# Patient Record
Sex: FEMALE | Race: WHITE
[De-identification: names, ages, dates, MRNs, and addresses within clinical notes are randomized per-mention and may not be internally consistent; named-entity substitution may affect disease eponyms.]

---

## 2021-10-28 ENCOUNTER — HOSPITAL ENCOUNTER (OUTPATIENT)
Dept: HOSPITAL 75 - OCC | Age: 36
End: 2021-10-28
Attending: FAMILY MEDICINE

## 2021-10-28 DIAGNOSIS — M54.9: Primary | ICD-10-CM

## 2021-10-28 PROCEDURE — 72070 X-RAY EXAM THORAC SPINE 2VWS: CPT

## 2021-10-28 NOTE — DIAGNOSTIC IMAGING REPORT
INDICATION: 

Back pain. Possible injury.



COMPARISON: 

None.



FINDINGS: 

Frontal and lateral views of the thoracic spine were obtained.

Visualization of the upper thoracic spine is limited on the

lateral projection. Alignment and vertebral heights are

maintained. There is no fracture or destructive process. There

are no large paraspinal masses. Mild multilevel degenerative

changes are noted and consist of multilevel intervertebral disc

height loss with small anterior and posterior endplate osteophyte

formations. Limited views of the lungs are clear.



IMPRESSION: 

1. No acute fracture or dislocation of the thoracic spine.

2. Mild multilevel degenerative changes.



Dictated by: 



  Dictated on workstation # [id number]

## 2023-02-16 ENCOUNTER — HOSPITAL ENCOUNTER (OUTPATIENT)
Dept: HOSPITAL 75 - RAD | Age: 38
End: 2023-02-16
Attending: STUDENT IN AN ORGANIZED HEALTH CARE EDUCATION/TRAINING PROGRAM
Payer: COMMERCIAL

## 2023-02-16 DIAGNOSIS — M25.852: Primary | ICD-10-CM

## 2023-02-16 DIAGNOSIS — M70.62: ICD-10-CM

## 2023-02-16 PROCEDURE — 73525 CONTRAST X-RAY OF HIP: CPT

## 2023-02-16 PROCEDURE — 73722 MRI JOINT OF LWR EXTR W/DYE: CPT

## 2023-02-16 PROCEDURE — 27093 INJECTION FOR HIP X-RAY: CPT

## 2023-02-16 NOTE — DIAGNOSTIC IMAGING REPORT
INDICATION: Hip pain, questioned impingement.



EXAMINATION: Left hip MRI with contrast, 02/16/2023. 



FINDINGS: There is a focal osseous protuberance at the anterior

femoral head neck junction. There is minimal fraying of the

anterior superior labrum with no displaced tears. Cartilage

throughout the joint appears maintained.



No acute osseous abnormality is appreciated.



There is diffuse T2 hyperintensity overlying the left greater

trochanter at the attachment of the adjacent tendons. There is a

suspected likely tear of the gluteus minimus and medius tendons

poorly evaluated due to the large field-of-view on the T2

fat-saturated sequence. Edema extends into the adjacent gluteal

musculature. Mild similar findings noted in the opposite hip but

less pronounced with findings on the right likely due to focal

tendinosis at the greater trochanter.



No acute osseous abnormality is noted within the pelvis or either

hip.



Hamstrings tendon origins intact. Iliopsoas tendons and

musculature are intact, as visualized.



Visualized intrapelvic structures demonstrate of free fluid,

likely physiologic or due to a recently ruptured cyst. Bilateral

cystic changes in the perineum noted, right greater than left,

with the largest on the right measuring 2.4 cm in greatest

dimension. These are nonspecific and could represent nabothian

cysts. Bartholin gland cyst or other cystic lesion is not

excluded. Physical examination and/or sonography may provide

further characterization.



IMPRESSION: 

1. Fraying of the anterior superior labrum with a discrete tear

not seen. There is an osseous protuberance which is small at the

anterior femoral head neck junction of the left hip. 

2. Partial tears of the gluteus minimus and medius tendon

suspected with surrounding edema. Mild edema on the right likely

due to focal tendinosis. 

3. Free fluid in pelvis most likely physiologic or due to a

recently ruptured cyst with nonspecific cystic changes in the

perineal region, bilaterally, see above discussion and

recommendations. 



Dictated by: 



  Dictated on workstation # [id number]

## 2023-02-16 NOTE — DIAGNOSTIC IMAGING REPORT
INDICATION: Hip pain.



EXAMINATION: Fluoroscopy, left hip injection for MRI. 



PROCEDURE: Following aseptic preparation of the skin and

administration of local anesthesia, a left hip injection was

performed using fluoroscopic guidance. A 20-gauge needle was

advanced into the left hip joint and approximately 10 mL of a

mixture of Omnipaque 240, saline and 0.4cc Clariscan was

injected. The patient tolerated the procedure well and was sent

to the MR suite in good condition. 



IMPRESSION: There has been a successful injection of the left hip

joint. MRI is pending for further study.



Dictated by: 



  Dictated on workstation # [id number]

## 2023-02-27 ENCOUNTER — HOSPITAL ENCOUNTER (OUTPATIENT)
Dept: HOSPITAL 75 - REHAB | Age: 38
LOS: 1 days | Discharge: HOME | End: 2023-02-28
Attending: STUDENT IN AN ORGANIZED HEALTH CARE EDUCATION/TRAINING PROGRAM
Payer: COMMERCIAL

## 2023-02-27 DIAGNOSIS — M76.02: ICD-10-CM

## 2023-02-27 DIAGNOSIS — M25.852: Primary | ICD-10-CM

## 2023-02-27 DIAGNOSIS — M70.62: ICD-10-CM

## 2023-03-21 ENCOUNTER — HOSPITAL ENCOUNTER (OUTPATIENT)
Dept: HOSPITAL 75 - REHAB | Age: 38
LOS: 10 days | Discharge: HOME | End: 2023-03-31
Attending: STUDENT IN AN ORGANIZED HEALTH CARE EDUCATION/TRAINING PROGRAM
Payer: COMMERCIAL

## 2023-03-21 DIAGNOSIS — R26.89: ICD-10-CM

## 2023-03-21 DIAGNOSIS — M76.02: ICD-10-CM

## 2023-03-21 DIAGNOSIS — M70.62: ICD-10-CM

## 2023-03-21 DIAGNOSIS — M25.852: Primary | ICD-10-CM

## 2023-06-27 ENCOUNTER — HOSPITAL ENCOUNTER (OUTPATIENT)
Dept: HOSPITAL 75 - REHAB | Age: 38
LOS: 3 days | Discharge: HOME | End: 2023-06-30
Attending: ORTHOPAEDIC SURGERY
Payer: COMMERCIAL

## 2023-06-27 DIAGNOSIS — Z98.890: ICD-10-CM

## 2023-06-27 DIAGNOSIS — S73.102D: Primary | ICD-10-CM

## 2023-07-12 ENCOUNTER — HOSPITAL ENCOUNTER (OUTPATIENT)
Dept: HOSPITAL 75 - REHAB | Age: 38
LOS: 19 days | Discharge: HOME | End: 2023-07-31
Attending: ORTHOPAEDIC SURGERY
Payer: COMMERCIAL
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
## 2023-07-12 DIAGNOSIS — Z98.890: ICD-10-CM

## 2023-07-12 DIAGNOSIS — S73.102D: Primary | ICD-10-CM
